# Patient Record
Sex: MALE | Race: WHITE | NOT HISPANIC OR LATINO | Employment: FULL TIME
[De-identification: names, ages, dates, MRNs, and addresses within clinical notes are randomized per-mention and may not be internally consistent; named-entity substitution may affect disease eponyms.]

---

## 2018-03-24 ENCOUNTER — HEALTH MAINTENANCE LETTER (OUTPATIENT)
Age: 53
End: 2018-03-24

## 2020-07-28 ENCOUNTER — DOCUMENTATION ONLY (OUTPATIENT)
Dept: FAMILY MEDICINE | Facility: CLINIC | Age: 55
End: 2020-07-28

## 2020-07-28 NOTE — PROGRESS NOTES
I have not seen him since 2014.  My guess is that INR would be requested and ordered by vascular team at [facility]?  Can we ask him who his physician is and place INR under that doctor's name?    True Galloway MD, MD

## 2020-07-28 NOTE — PROGRESS NOTES
There are no orders for this patient for the upcoming lab visit. Please order labs as needed.     Reason for visit INR.    Thank you, lab.

## 2021-04-22 ENCOUNTER — TRANSCRIBE ORDERS (OUTPATIENT)
Dept: OTHER | Age: 56
End: 2021-04-22

## 2021-04-22 DIAGNOSIS — Z95.2 AORTIC VALVE PROSTHESIS PRESENT: Primary | ICD-10-CM

## 2021-05-25 ENCOUNTER — HOSPITAL ENCOUNTER (OUTPATIENT)
Dept: CARDIAC REHAB | Facility: CLINIC | Age: 56
End: 2021-05-25
Attending: INTERNAL MEDICINE
Payer: COMMERCIAL

## 2021-05-25 PROCEDURE — 93797 PHYS/QHP OP CAR RHAB WO ECG: CPT | Performed by: REHABILITATION PRACTITIONER

## 2021-05-25 PROCEDURE — 93798 PHYS/QHP OP CAR RHAB W/ECG: CPT | Performed by: REHABILITATION PRACTITIONER

## 2021-06-16 ENCOUNTER — HOSPITAL ENCOUNTER (OUTPATIENT)
Dept: CARDIAC REHAB | Facility: CLINIC | Age: 56
End: 2021-06-16
Attending: THORACIC SURGERY (CARDIOTHORACIC VASCULAR SURGERY)
Payer: COMMERCIAL

## 2021-06-16 PROCEDURE — 93798 PHYS/QHP OP CAR RHAB W/ECG: CPT

## 2021-06-30 ENCOUNTER — HOSPITAL ENCOUNTER (OUTPATIENT)
Dept: CARDIAC REHAB | Facility: CLINIC | Age: 56
End: 2021-06-30
Attending: THORACIC SURGERY (CARDIOTHORACIC VASCULAR SURGERY)
Payer: COMMERCIAL

## 2021-06-30 PROCEDURE — 93798 PHYS/QHP OP CAR RHAB W/ECG: CPT

## 2021-07-07 ENCOUNTER — HOSPITAL ENCOUNTER (OUTPATIENT)
Dept: CARDIAC REHAB | Facility: CLINIC | Age: 56
End: 2021-07-07
Attending: THORACIC SURGERY (CARDIOTHORACIC VASCULAR SURGERY)
Payer: COMMERCIAL

## 2021-07-07 PROCEDURE — 93798 PHYS/QHP OP CAR RHAB W/ECG: CPT | Performed by: REHABILITATION PRACTITIONER

## 2022-10-26 ENCOUNTER — TRANSCRIBE ORDERS (OUTPATIENT)
Dept: OTHER | Age: 57
End: 2022-10-26

## 2022-10-26 ENCOUNTER — PATIENT OUTREACH (OUTPATIENT)
Dept: ONCOLOGY | Facility: CLINIC | Age: 57
End: 2022-10-26

## 2022-10-26 DIAGNOSIS — R97.20 ELEVATED PROSTATE SPECIFIC ANTIGEN (PSA): Primary | ICD-10-CM

## 2022-10-26 NOTE — TELEPHONE ENCOUNTER
Action 2022 JTV 3:26 PM    Action Taken NAVDEEP sent an urgent request to [facility] for images to be pushed.      Action 2022 JTV 6:36 AM    Action Taken NAVDEEP received and resolved images from [facility]. -- JTV       MEDICAL RECORDS REQUEST   [facility]  Urology Clinic  [address] [place] [address]  PHONE: [phone]  Fax: [phone]        FUTURE VISIT INFORMATION                                                   Jeffy Mccray, : 1965 scheduled for future visit at [facility]    APPOINTMENT INFORMATION:    Date: 2022    Provider:  Philomena Adams MD    Reason for Visit/Diagnosis: Abnormal PSA    RECORDS REQUESTED FOR VISIT                                                     NOTES  STATUS/DETAILS   OFFICE NOTE from other specialist  yes, 10/24/2022 -- Elysia Tubbs M.D., M.Ed. @ [facility]   MEDICATION LIST  yes   LABS     URINALYSIS (UA)  yes   PSA (LAB)  yes   IMAGES  yes, [facility]  10/19/2022 -- MR ABD PELVIS     PRE-VISIT CHECKLIST      Record collection complete yes   Appointment appropriately scheduled           (right time/right provider) Yes   Joint diagnostic appointment coordinated correctly          (ensure right order & amount of time) Yes   MyChart activation Yes   Questionnaire complete If no, please explain pending

## 2022-10-26 NOTE — PROGRESS NOTES
Patient's wife called seeking an appointment with Dr. Astorga for her 's elevated PSA.  He has not yet had a prostate biopsy or MRI of the prostate.  He is a patient of [facility] and they are seeking MRI sooner.  I let her know medical oncology does not typically see patients without a diagnosis of prostate cancer, and even then, typically only once it has metastasized.  I have given her the phone number for our urology department.  I also suggested checking with [facility] to see if they could request the MRI to be done at our facility instead.  She will look into these suggestions.  I will reject referral at this time for medical oncology.

## 2022-10-27 ENCOUNTER — PRE VISIT (OUTPATIENT)
Dept: UROLOGY | Facility: CLINIC | Age: 57
End: 2022-10-27

## 2022-10-27 NOTE — CONFIDENTIAL NOTE
Reason for visit: consult    Relevant information: elevated psa    Records/imaging/labs/orders: MR abdomen pelvis is process    At Rooming: video    Crystal Ambrose  10/27/2022  9:08 AM

## 2022-11-03 ENCOUNTER — PRE VISIT (OUTPATIENT)
Dept: UROLOGY | Facility: CLINIC | Age: 57
End: 2022-11-03

## 2022-12-03 ENCOUNTER — HEALTH MAINTENANCE LETTER (OUTPATIENT)
Age: 57
End: 2022-12-03

## 2023-12-05 ENCOUNTER — ANCILLARY PROCEDURE (OUTPATIENT)
Dept: GENERAL RADIOLOGY | Facility: CLINIC | Age: 58
End: 2023-12-05
Attending: EMERGENCY MEDICINE
Payer: COMMERCIAL

## 2023-12-05 ENCOUNTER — OFFICE VISIT (OUTPATIENT)
Dept: URGENT CARE | Facility: URGENT CARE | Age: 58
End: 2023-12-05
Payer: COMMERCIAL

## 2023-12-05 VITALS
OXYGEN SATURATION: 96 % | WEIGHT: 215 LBS | BODY MASS INDEX: 29.99 KG/M2 | HEART RATE: 79 BPM | TEMPERATURE: 97.9 F | SYSTOLIC BLOOD PRESSURE: 95 MMHG | RESPIRATION RATE: 16 BRPM | DIASTOLIC BLOOD PRESSURE: 60 MMHG

## 2023-12-05 DIAGNOSIS — J06.9 UPPER RESPIRATORY TRACT INFECTION, UNSPECIFIED TYPE: ICD-10-CM

## 2023-12-05 DIAGNOSIS — J40 BRONCHITIS: Primary | ICD-10-CM

## 2023-12-05 DIAGNOSIS — H10.32 ACUTE CONJUNCTIVITIS OF LEFT EYE, UNSPECIFIED ACUTE CONJUNCTIVITIS TYPE: ICD-10-CM

## 2023-12-05 PROCEDURE — 99204 OFFICE O/P NEW MOD 45 MIN: CPT | Performed by: EMERGENCY MEDICINE

## 2023-12-05 PROCEDURE — 71046 X-RAY EXAM CHEST 2 VIEWS: CPT | Mod: TC | Performed by: RADIOLOGY

## 2023-12-05 RX ORDER — PREDNISONE 20 MG/1
40 TABLET ORAL DAILY
Qty: 10 TABLET | Refills: 0 | Status: SHIPPED | OUTPATIENT
Start: 2023-12-05 | End: 2023-12-10

## 2023-12-05 RX ORDER — BENZONATATE 100 MG/1
100 CAPSULE ORAL 3 TIMES DAILY PRN
Qty: 25 CAPSULE | Refills: 0 | Status: SHIPPED | OUTPATIENT
Start: 2023-12-05

## 2023-12-05 RX ORDER — POLYMYXIN B SULFATE AND TRIMETHOPRIM 1; 10000 MG/ML; [USP'U]/ML
1-2 SOLUTION OPHTHALMIC EVERY 6 HOURS
Qty: 5 ML | Refills: 0 | Status: SHIPPED | OUTPATIENT
Start: 2023-12-05 | End: 2023-12-10

## 2023-12-05 NOTE — PROGRESS NOTES
Assessment & Plan     Diagnosis:    ICD-10-CM    1. Bronchitis  J40 XR Chest 2 Views     predniSONE (DELTASONE) 20 MG tablet     benzonatate (TESSALON) 100 MG capsule      2. Acute conjunctivitis of left eye, unspecified acute conjunctivitis type  H10.32 polymixin b-trimethoprim (POLYTRIM) 53343-8.1 UNIT/ML-% ophthalmic solution          Medical Decision Making:  Jeffy Mccray is a 58 year old male who presents for evaluation of cough, congestion, eye redness.  This is consistent with an upper respiratory tract infection.  There is no signs at this point of serious bacterial infection such as OM, RPA, epiglottitis, PTA, strep pharyngitis, pneumonia, meningitis, bacteremia, serious bacterial infection.      Given productive cough, I did a CXR to eval for pneumonia. This shows no infiltrate or effusion on my read.  Radiology notes as per below. There are no signs of complications of URI/bronchitis at this point such as hypoxia, respiratory failure or compromise.  Will start on medications for symptomatic management as noted above.  Does have signs of conjunctivitis, will treat this with antibiotic drops.    There are no gastrointestinal symptoms at this point and no signs of dehydration.  Close followup with PCP is indicated.  Go to ED for fever > 102 F, protracted vomiting, worsening shortness of breath, chest pain or other concerns.  Patient verbalized understanding and agreement with the plan was discharged in stable condition.      Norman Garcia PA-C  [facility] URGENT CARE    Subjective     Jeffy Mccray is a 58 year old male who presents to clinic today for the following health issues:  Chief Complaint   Patient presents with    Urgent Care     Chest congestion,cough  productive, dark green mucus, wheezing x Friday -tested negative for Covid        HPI  Patient reports for the last 4-5 days he has been experiencing a productive cough with clear and green/brown phlegm intermittently, nasal congestion,  discharge from the left eye yesterday.  Tested negative for COVID.  States he feels slightly short of breath due to all the coughing fits.  Denies any chest pain, shortness of breath at rest or with minimal exertion, fevers, abdominal pain, nausea, vomiting, diarrhea or other concerns.    Review of Systems    See HPI    Objective      Vitals: BP 95/60   Pulse 79   Temp 97.9  F (36.6  C) (Tympanic)   Resp 16   Wt 97.5 kg (215 lb)   SpO2 96%   BMI 29.99 kg/m    =  Patient Vitals for the past 24 hrs:   BP Temp Temp src Pulse Resp SpO2 Weight   12/05/23 1015 95/60 97.9  F (36.6  C) Tympanic 79 16 96 % 97.5 kg (215 lb)       Vital signs reviewed by: Norman Garcia PA-C    Physical Exam   Constitutional: Patient is alert and cooperative. No acute distress.  Ears: Right TM is normal. Left TM is normal. External ear canals are normal.  Mouth: Mucous membranes are moist. Normal tongue and tonsil. Posterior oropharynx is clear.  Eyes: Conjunctivae, EOMI and lids are normal. PERRL.  Cardiovascular: Regular rate and rhythm  Pulmonary/Chest: Faint wheezes and rhonchi throughout.  No rales.  Speaking in full sentences.  Effort normal.  No respiratory distress.  Neurological: Alert and oriented x3.   Skin: No rash noted on visualized skin.  Psychiatric:The patient has a normal mood and affect.     Labs/Imaging:  Results for orders placed or performed in visit on 12/05/23   XR Chest 2 Views     Status: None (Preliminary result)    Narrative    CHEST 2 VIEWS   12/5/2023 10:39 AM     HISTORY: Upper respiratory tract infection, unspecified type.    COMPARISON: None.      Impression    IMPRESSION: No acute cardiopulmonary disease.     Reading per radiology      Norman Garcia PA-C, December 5, 2023

## 2023-12-14 ENCOUNTER — OFFICE VISIT (OUTPATIENT)
Dept: URGENT CARE | Facility: URGENT CARE | Age: 58
End: 2023-12-14
Payer: COMMERCIAL

## 2023-12-14 VITALS
HEART RATE: 89 BPM | SYSTOLIC BLOOD PRESSURE: 125 MMHG | DIASTOLIC BLOOD PRESSURE: 69 MMHG | BODY MASS INDEX: 29.99 KG/M2 | WEIGHT: 215 LBS | RESPIRATION RATE: 18 BRPM | OXYGEN SATURATION: 99 % | TEMPERATURE: 98.5 F

## 2023-12-14 DIAGNOSIS — J20.9 ACUTE BRONCHITIS, UNSPECIFIED ORGANISM: Primary | ICD-10-CM

## 2023-12-14 DIAGNOSIS — J22 LOWER RESPIRATORY INFECTION: ICD-10-CM

## 2023-12-14 PROCEDURE — 99203 OFFICE O/P NEW LOW 30 MIN: CPT | Performed by: PHYSICIAN ASSISTANT

## 2023-12-14 RX ORDER — BENZONATATE 200 MG/1
200 CAPSULE ORAL 3 TIMES DAILY PRN
Qty: 30 CAPSULE | Refills: 0 | Status: SHIPPED | OUTPATIENT
Start: 2023-12-14

## 2023-12-14 NOTE — PROGRESS NOTES
SUBJECTIVE:   Jeffy Mccray is a 58 year old male presenting with a chief complaint of   Chief Complaint   Patient presents with    Urgent Care     Seen on 12/5 for Bronchitis and still not better, says he was told to come back        He is an established patient of [facility].  Presents with complaints of ongoing bronchitis.  Was seen on 12/5 wherein a cxr and cbc were performed and was found to be within normal range.  States utilizing tessalon prescribed and finished prednisone.  He has been ill since 12/1.  No new symptoms.   Clear runny nose.          Review of Systems    Past Medical History:   Diagnosis Date    Acute renal failure (H24)     Aortic valve replaced     x2 (currently mechanical valve) - Dr. Jennings [facility]    Crohn's disease (H)     Dr. Remi Hayedn at [facility]    CVA (cerebral infarction)     Giant cell aortic arteritis (H)     Dr. Jennings and rheumatology at [facility]    Rectal abscess     TIA (transient ischaemic attack)      Family History   Problem Relation Age of Onset    Cancer Mother         skin cancer    Colon Cancer Father      Current Outpatient Medications   Medication Sig Dispense Refill    ASPIRIN PO Take 81 mg by mouth      benzonatate (TESSALON) 200 MG capsule Take 1 capsule (200 mg) by mouth 3 times daily as needed for cough 30 capsule 0    LOPERAMIDE HCL PO       mycophenolate (CELLCEPT) 500 MG tablet Take 1,500 mg by mouth daily 4 tabs daily      vitamin  B complex with vitamin C (VITAMIN  B COMPLEX) TABS Take 1 tablet by mouth daily      warfarin (COUMADIN) 7.5 MG tablet Take 15 mg by mouth daily 10 to 15 mg 30 tablet     benzonatate (TESSALON) 100 MG capsule Take 1 capsule (100 mg) by mouth 3 times daily as needed for cough (Patient not taking: Reported on 12/14/2023) 25 capsule 0    lidocaine (LIDODERM) 5 % patch Place 1 patch onto the skin every 24 hours 10 patch 0    predniSONE (DELTASONE) 20 MG tablet Take 1 tablet (20 mg) by mouth daily Take with food (Patient  not taking: Reported on 12/14/2023) 5 tablet 0     Social History     Tobacco Use    Smoking status: Former     Packs/day: 0.50     Years: 10.00     Additional pack years: 0.00     Total pack years: 5.00     Types: Cigarettes    Smokeless tobacco: Former     Types: Chew    Tobacco comments:     35 years   Substance Use Topics    Alcohol use: No     Alcohol/week: 0.0 standard drinks of alcohol     Comment: rare 1-2/month       OBJECTIVE  /69   Pulse 89   Temp 98.5  F (36.9  C) (Tympanic)   Resp 18   Wt 97.5 kg (215 lb)   SpO2 99%   BMI 29.99 kg/m      Physical Exam  Vitals and nursing note reviewed.   Constitutional:       General: He is not in acute distress.     Appearance: Normal appearance. He is normal weight. He is not ill-appearing.   HENT:      Head: Normocephalic and atraumatic.      Right Ear: Tympanic membrane, ear canal and external ear normal.      Left Ear: Tympanic membrane, ear canal and external ear normal.      Nose: Nose normal.      Mouth/Throat:      Mouth: Mucous membranes are moist.      Pharynx: Oropharynx is clear.   Eyes:      Extraocular Movements: Extraocular movements intact.      Conjunctiva/sclera: Conjunctivae normal.   Cardiovascular:      Rate and Rhythm: Normal rate and regular rhythm.      Pulses: Normal pulses.      Heart sounds: Normal heart sounds.   Pulmonary:      Effort: Pulmonary effort is normal.      Breath sounds: Normal breath sounds. No wheezing, rhonchi or rales.   Musculoskeletal:      Cervical back: Normal range of motion and neck supple. No rigidity. No muscular tenderness.   Skin:     General: Skin is warm and dry.   Neurological:      General: No focal deficit present.      Mental Status: He is alert.   Psychiatric:         Mood and Affect: Mood normal.         Behavior: Behavior normal.         Labs:  No results found for this or any previous visit (from the past 24 hour(s)).    ASSESSMENT:      ICD-10-CM    1. Acute bronchitis, unspecified organism   J20.9 benzonatate (TESSALON) 200 MG capsule           Medical Decision Making:    Differential Diagnosis:  URI Adult/Peds:  Bronchitis-viral    Serious Comorbid Conditions:  Adult:   reviewed    PLAN:    RF on tessalon perles.  Discussed reasons to seek immediate medical attention.  Additionally if no improvement or worsening in one week, may follow up with PCP and/or UC.    Discussed expected course and reasons to seek immediate medical attention.      Followup:    If not improving or if condition worsens, follow up with your Primary Care Provider, If not improving or if conditions worsens over the next 12-24 hours, go to the Emergency Department    There are no Patient Instructions on file for this visit.

## 2023-12-21 ENCOUNTER — NURSE TRIAGE (OUTPATIENT)
Dept: NURSING | Facility: CLINIC | Age: 58
End: 2023-12-21
Payer: COMMERCIAL

## 2023-12-21 NOTE — TELEPHONE ENCOUNTER
Nurse Triage SBAR    Is this a 2nd Level Triage? No    Situation: Spouse, Marisol, is calling to say that the  Provider called the spouse that there was a new Rx sent for antibiotic (augmentin) sent to pharmacy. Spouse is requesting the Rx sent to a different pharmacy due to patient location today. Spouse is wondering if the spouse is contagious as he is supposed to visit elderly relatives today.     CTC on file.     Background: Patient was seen in  on 12/5/23 for bronchitis. Was seen in  on 12/14/23 for bronchitis.  AVS from 12/14/23 visit was reviewed.     Assessment:   No triage at time of call. Spouse is not with patient; spouse has general questions about antibiotic and if patient is infectious.         Recommendation: Per disposition, Information provided. Advised spouse to call the preferred pharmacy and request a transfer of Rx. Information as stated above  regarding contagiousness; spouse verbalizes understanding. Declines additional questions. Advised spouse to call back with any new or worsening symptoms. Spouse verbalized understanding and agrees with plan.     Protocol Recommended Disposition: Telephone advice    Madeleine Juárez RN on 12/21/2023 at 10:22 AM  [facility]  Reason for Disposition   General information question, no triage required and triager able to answer question    Protocols used: Information Only Call - No Triage-A-

## 2024-01-13 ENCOUNTER — HEALTH MAINTENANCE LETTER (OUTPATIENT)
Age: 59
End: 2024-01-13

## 2024-12-26 ENCOUNTER — OFFICE VISIT (OUTPATIENT)
Dept: URGENT CARE | Facility: URGENT CARE | Age: 59
End: 2024-12-26
Payer: COMMERCIAL

## 2024-12-26 VITALS
WEIGHT: 218 LBS | DIASTOLIC BLOOD PRESSURE: 54 MMHG | OXYGEN SATURATION: 95 % | SYSTOLIC BLOOD PRESSURE: 95 MMHG | HEART RATE: 83 BPM | TEMPERATURE: 96.9 F | RESPIRATION RATE: 18 BRPM | BODY MASS INDEX: 30.4 KG/M2

## 2024-12-26 DIAGNOSIS — H65.92 OME (OTITIS MEDIA WITH EFFUSION), LEFT: ICD-10-CM

## 2024-12-26 DIAGNOSIS — H61.22 IMPACTED CERUMEN OF LEFT EAR: Primary | ICD-10-CM

## 2024-12-26 RX ORDER — METOPROLOL SUCCINATE 100 MG/1
100 TABLET, EXTENDED RELEASE ORAL
COMMUNITY
Start: 2024-12-23

## 2024-12-26 RX ORDER — LISINOPRIL 10 MG/1
10 TABLET ORAL DAILY
COMMUNITY
Start: 2023-08-05

## 2024-12-26 RX ORDER — ALLOPURINOL 200 MG/1
TABLET ORAL
COMMUNITY
Start: 2023-08-05

## 2024-12-26 RX ORDER — ATORVASTATIN CALCIUM 40 MG/1
40 TABLET, FILM COATED ORAL DAILY
COMMUNITY
Start: 2024-12-23

## 2024-12-26 RX ORDER — DOXYCYCLINE HYCLATE 100 MG
100 TABLET ORAL 2 TIMES DAILY
Qty: 14 TABLET | Refills: 0 | Status: SHIPPED | OUTPATIENT
Start: 2024-12-26 | End: 2025-01-02

## 2024-12-26 NOTE — PROGRESS NOTES
Assessment & Plan   Problem List Items Addressed This Visit    None  Visit Diagnoses       Impacted cerumen of left ear    -  Primary    OME (otitis media with effusion), left        Relevant Medications    doxycycline hyclate (VIBRA-TABS) 100 MG tablet           Patient will contact the Coumadin clinic tomorrow for warfarin adjustment with utilization of antibiotics.  Patient advised if symptoms persist, worsen or new symptoms arise they are to seek medical care.  All patients questions addressed. Patient verbalized understanding and agreement with plan.              No follow-ups on file.      Subjective   Tad is a 59 year old, presenting for the following health issues:  Urgent Care and Otalgia (Plugged mostly in left ear, unable to hear onset 1 day )         No data to display              HPI               Review of Systems  Constitutional, HEENT, cardiovascular, pulmonary, GI, , musculoskeletal, neuro, skin, endocrine and psych systems are negative, except as otherwise noted.      Objective    BP 95/54 (BP Location: Left arm, Patient Position: Sitting, Cuff Size: Adult Large)   Pulse 83   Temp 96.9  F (36.1  C) (Oral)   Resp 18   Wt 98.9 kg (218 lb)   SpO2 95%   BMI 30.40 kg/m    Body mass index is 30.4 kg/m .  Physical Exam   GENERAL: alert and no distress  EYES: Eyes grossly normal to inspection,   conjunctivae and sclerae normal  HENT: normal cephalic/atraumatic right TM is gray without effusion erythema left cerumen impacted with utilization of alligator forcep large amount of cerumen is removed the TM is erythematous drainage is noted and oral mucous membranes moist  RESP: respirations regular nonlabored   PSYCH: mentation appears normal, affect normal/bright            Signed Electronically by: Millicent Schumacher NP

## 2025-01-07 ENCOUNTER — OFFICE VISIT (OUTPATIENT)
Dept: URGENT CARE | Facility: URGENT CARE | Age: 60
End: 2025-01-07
Payer: COMMERCIAL

## 2025-01-07 VITALS
WEIGHT: 219 LBS | SYSTOLIC BLOOD PRESSURE: 134 MMHG | RESPIRATION RATE: 16 BRPM | DIASTOLIC BLOOD PRESSURE: 84 MMHG | BODY MASS INDEX: 30.54 KG/M2 | OXYGEN SATURATION: 97 % | TEMPERATURE: 97.6 F | HEART RATE: 78 BPM

## 2025-01-07 DIAGNOSIS — H60.62 CHRONIC OTITIS EXTERNA OF LEFT EAR, UNSPECIFIED TYPE: ICD-10-CM

## 2025-01-07 DIAGNOSIS — H65.92 OME (OTITIS MEDIA WITH EFFUSION), LEFT: Primary | ICD-10-CM

## 2025-01-07 PROCEDURE — 99213 OFFICE O/P EST LOW 20 MIN: CPT | Performed by: STUDENT IN AN ORGANIZED HEALTH CARE EDUCATION/TRAINING PROGRAM

## 2025-01-07 RX ORDER — CIPROFLOXACIN AND DEXAMETHASONE 3; 1 MG/ML; MG/ML
4 SUSPENSION/ DROPS AURICULAR (OTIC) 2 TIMES DAILY
Qty: 7.5 ML | Refills: 0 | Status: SHIPPED | OUTPATIENT
Start: 2025-01-07 | End: 2025-01-12

## 2025-01-07 NOTE — PROGRESS NOTES
chief complaint Ear pain    HPI:  Jeffy Mccray is a 59 year old male who presents today complaining of ear pain. Has been having ear pain for many weeks now, patient was seen on 12/26/24, prescribed doxycyline, felt worse after.  Still having pain which is now extended to his tragus.  No fever, chills or any other symptoms.  Uses Q-tips, has a very big that that always looks his ears and he thinks this is probably where his infection came from.      History obtained from the patient.    Problem List:  2014-11: CARDIOVASCULAR SCREENING; LDL GOAL LESS THAN 160  Crohn's disease (H)  Giant cell aortic arteritis (H)  Aortic valve replaced      Past Medical History:   Diagnosis Date    Acute renal failure (H)     Aortic valve replaced     x2 (currently mechanical valve) - Dr. Jennings [facility]    Crohn's disease (H)     Dr. Remi Hayden at [facility]    CVA (cerebral infarction)     Giant cell aortic arteritis (H)     Dr. Jennings and rheumatology at [facility]    Rectal abscess     TIA (transient ischaemic attack)        Social History     Tobacco Use    Smoking status: Former     Current packs/day: 0.50     Average packs/day: 0.5 packs/day for 10.0 years (5.0 ttl pk-yrs)     Types: Cigarettes    Smokeless tobacco: Former     Types: Chew    Tobacco comments:     35 years   Substance Use Topics    Alcohol use: No     Alcohol/week: 0.0 standard drinks of alcohol     Comment: rare 1-2/month       Review of systems  ROS negative except for pertinent positives listed in HPI      There were no vitals filed for this visit.    Physical Exam  Constitutional: healthy, alert, and no distress  Head: Normocephalic.   Neck: Neck supple. No adenopathy.   ENT: Left ear TM remarkably bulging, red and cloudy, purulent discharge surrounding the external ear canal.  ENT exam normal, no neck nodes or sinus tenderness  Respiratory:unlabored respiratory effort  Musculoskeletal: extremities normal- no gross deformities noted, gait  normal  Psychiatric: mentation appears normal and affect normal/bright    Assessment & Plan   Jeffy was seen today for urgent care.    Diagnoses and all orders for this visit:    OME (otitis media with effusion), left  -     amoxicillin-clavulanate (AUGMENTIN) 875-125 MG tablet; Take 1 tablet by mouth 2 times daily for 10 days.    Chronic otitis externa of left ear, unspecified type  -     ciprofloxacin-dexAMETHasone (CIPRODEX) 0.3-0.1 % otic suspension; Place 4 drops Into the left ear 2 times daily for 5 days.      At the end of the encounter, I discussed results, diagnosis, medications. Discussed red flags for immediate return to clinic/ER, as well as indications for follow up if no improvement. Patient understood and agreed to plan. Patient was stable for discharge.

## 2025-01-25 ENCOUNTER — HEALTH MAINTENANCE LETTER (OUTPATIENT)
Age: 60
End: 2025-01-25

## 2025-07-13 ENCOUNTER — PATIENT OUTREACH (OUTPATIENT)
Dept: CARE COORDINATION | Facility: CLINIC | Age: 60
End: 2025-07-13
Payer: COMMERCIAL